# Patient Record
Sex: MALE | Race: WHITE | NOT HISPANIC OR LATINO | Employment: OTHER | URBAN - METROPOLITAN AREA
[De-identification: names, ages, dates, MRNs, and addresses within clinical notes are randomized per-mention and may not be internally consistent; named-entity substitution may affect disease eponyms.]

---

## 2017-04-04 ENCOUNTER — OFFICE VISIT (OUTPATIENT)
Dept: CARDIOLOGY | Facility: CLINIC | Age: 82
End: 2017-04-04
Payer: MEDICARE

## 2017-04-04 ENCOUNTER — CLINICAL SUPPORT (OUTPATIENT)
Dept: CARDIOLOGY | Facility: CLINIC | Age: 82
End: 2017-04-04
Payer: MEDICARE

## 2017-04-04 VITALS
DIASTOLIC BLOOD PRESSURE: 77 MMHG | HEIGHT: 67 IN | BODY MASS INDEX: 29.52 KG/M2 | HEART RATE: 51 BPM | WEIGHT: 188.06 LBS | SYSTOLIC BLOOD PRESSURE: 151 MMHG

## 2017-04-04 DIAGNOSIS — I65.23 BILATERAL CAROTID ARTERY STENOSIS: ICD-10-CM

## 2017-04-04 DIAGNOSIS — I65.23 BILATERAL CAROTID ARTERY STENOSIS: Primary | ICD-10-CM

## 2017-04-04 DIAGNOSIS — I10 ESSENTIAL HYPERTENSION: ICD-10-CM

## 2017-04-04 DIAGNOSIS — I25.10 CORONARY ARTERY DISEASE INVOLVING NATIVE CORONARY ARTERY OF NATIVE HEART WITHOUT ANGINA PECTORIS: ICD-10-CM

## 2017-04-04 LAB — INTERNAL CAROTID STENOSIS: NORMAL

## 2017-04-04 PROCEDURE — 93880 EXTRACRANIAL BILAT STUDY: CPT | Mod: 26,S$PBB,, | Performed by: INTERNAL MEDICINE

## 2017-04-04 PROCEDURE — 99999 PR PBB SHADOW E&M-EST. PATIENT-LVL III: CPT | Mod: PBBFAC,,, | Performed by: INTERNAL MEDICINE

## 2017-04-04 PROCEDURE — 99215 OFFICE O/P EST HI 40 MIN: CPT | Mod: S$PBB,,, | Performed by: INTERNAL MEDICINE

## 2017-04-04 PROCEDURE — 99213 OFFICE O/P EST LOW 20 MIN: CPT | Mod: PBBFAC | Performed by: INTERNAL MEDICINE

## 2017-04-04 RX ORDER — FENOFIBRATE 160 MG/1
160 TABLET ORAL DAILY
COMMUNITY

## 2017-04-04 RX ORDER — CLOPIDOGREL BISULFATE 75 MG/1
75 TABLET ORAL DAILY
Qty: 30 TABLET | Refills: 11 | Status: SHIPPED | OUTPATIENT
Start: 2017-04-04

## 2017-04-04 NOTE — PROGRESS NOTES
"Subjective:       Patient ID: Narinder Cueto Jr. is a 83 y.o. male.    Chief Complaint: Coronary Artery Disease    HPI     Mr. Cueto is an 83 year old gentleman with a past medical history of CVA, CAD, DM2, HTN, COPD, prior tobacco abuse, and carotid artery stenosis s/p bilateral stenting. He initially received a LICA stent via the SCAFFOLD trial on 03/2015, but had instent restenosis whereby he had POBA to the stent in 09/2015. He also had a HANNAH stent (not a part of trial) in June 2015. He has been doing well since then with no complaints. He has no SOB or CP. He has chronic dizziness that has not changed. He denies any weakness, numbness or other stroke symptoms. He still mows his lawn and gill his flowers every day. He measures his blood pressure at home twice a day and it averages in the 130s systolic    Review of Systems   Constitutional: Negative for appetite change, chills, fatigue and fever.   HENT: Negative.    Eyes: Negative.    Respiratory: Negative for cough and shortness of breath.    Cardiovascular: Negative for chest pain, palpitations and leg swelling.   Gastrointestinal: Negative for abdominal pain, nausea and vomiting.   Genitourinary: Negative for dysuria and urgency.   Musculoskeletal: Negative for arthralgias and myalgias.   Skin: Negative for color change and rash.   Neurological: Negative for dizziness, light-headedness and headaches.   Psychiatric/Behavioral: Negative.        Objective:     Vitals:    04/04/17 0925 04/04/17 0934   BP: (!) 166/77 (!) 151/77   Pulse: (!) 51    Weight: 85.3 kg (188 lb 0.8 oz)    Height: 5' 7" (1.702 m)        Physical Exam   Constitutional: He is oriented to person, place, and time. Vital signs are normal. He appears well-developed and well-nourished. He is active and cooperative. No distress.   HENT:   Head: Normocephalic and atraumatic.   Right Ear: Hearing and external ear normal.   Left Ear: Hearing and external ear normal.   Nose: Nose normal.   Neck: No " JVD present. Carotid bruit is not present.   Cardiovascular: Normal rate, regular rhythm, S1 normal, S2 normal and normal heart sounds.  Exam reveals no gallop.    No murmur heard.  Pulses:       Carotid pulses are 2+ on the right side, and 2+ on the left side.       Radial pulses are 2+ on the right side, and 2+ on the left side.        Dorsalis pedis pulses are 2+ on the right side, and 2+ on the left side.        Posterior tibial pulses are 2+ on the right side, and 2+ on the left side.   Pulmonary/Chest: Effort normal and breath sounds normal. No respiratory distress. He has no decreased breath sounds. He has no wheezes. He has no rhonchi. He has no rales.   Abdominal: Soft. Normal appearance.   Neurological: He is alert and oriented to person, place, and time.   Skin: Skin is warm, dry and intact.       Assessment:       1. Bilateral carotid artery stenosis    2. Essential hypertension    3. Coronary artery disease involving native coronary artery of native heart without angina pectoris        Plan:       Mr. Cueto is an 83 year old gentleman with multiple comorbidities who presents for a 6 month follow up for the SCAFFOLD trial.    1.) Bilateral carotid artery stenosis  --reviewed carotid ultrasound. No significant stenosis observed  --currently asymptomatic. Will follow up in 1 year with repeat scan    2.) HTN  --will not change any meds as well controlled at home  --continue to monitor    3.) CAD  --no anginal symptoms currently  --will refill plavix.    Discussed with Dr. Hameed. Staff addendum to follow    Lexi Rodriguez MD  Cardiology PGY4  I have personally taken the history and examined this patient. I have discussed and agree with the resident's findings and plan as documented in the resident's note.  Mando Hameed

## 2018-04-03 ENCOUNTER — OFFICE VISIT (OUTPATIENT)
Dept: CARDIOLOGY | Facility: CLINIC | Age: 83
End: 2018-04-03
Payer: MEDICARE

## 2018-04-03 ENCOUNTER — RESEARCH ENCOUNTER (OUTPATIENT)
Dept: RESEARCH | Facility: HOSPITAL | Age: 83
End: 2018-04-03

## 2018-04-03 ENCOUNTER — CLINICAL SUPPORT (OUTPATIENT)
Dept: CARDIOLOGY | Facility: CLINIC | Age: 83
End: 2018-04-03
Attending: INTERNAL MEDICINE
Payer: MEDICARE

## 2018-04-03 VITALS
SYSTOLIC BLOOD PRESSURE: 142 MMHG | HEART RATE: 56 BPM | DIASTOLIC BLOOD PRESSURE: 81 MMHG | BODY MASS INDEX: 27.89 KG/M2 | HEIGHT: 67 IN | OXYGEN SATURATION: 96 % | WEIGHT: 177.69 LBS

## 2018-04-03 DIAGNOSIS — E78.5 DYSLIPIDEMIA: ICD-10-CM

## 2018-04-03 DIAGNOSIS — I10 ESSENTIAL HYPERTENSION: ICD-10-CM

## 2018-04-03 DIAGNOSIS — I65.23 BILATERAL CAROTID ARTERY STENOSIS: ICD-10-CM

## 2018-04-03 DIAGNOSIS — I25.10 CORONARY ARTERY DISEASE INVOLVING NATIVE CORONARY ARTERY OF NATIVE HEART WITHOUT ANGINA PECTORIS: ICD-10-CM

## 2018-04-03 DIAGNOSIS — R42 DIZZINESS: Primary | ICD-10-CM

## 2018-04-03 LAB — INTERNAL CAROTID STENOSIS: ABNORMAL

## 2018-04-03 PROCEDURE — 99215 OFFICE O/P EST HI 40 MIN: CPT | Mod: S$PBB,,, | Performed by: INTERNAL MEDICINE

## 2018-04-03 PROCEDURE — 99215 OFFICE O/P EST HI 40 MIN: CPT | Mod: PBBFAC | Performed by: INTERNAL MEDICINE

## 2018-04-03 PROCEDURE — 99999 PR PBB SHADOW E&M-EST. PATIENT-LVL V: CPT | Mod: PBBFAC,,, | Performed by: INTERNAL MEDICINE

## 2018-04-03 PROCEDURE — 93880 EXTRACRANIAL BILAT STUDY: CPT | Mod: PBBFAC | Performed by: INTERNAL MEDICINE

## 2018-04-03 RX ORDER — ERGOCALCIFEROL 1.25 MG/1
1000 CAPSULE ORAL DAILY
COMMUNITY

## 2018-04-03 NOTE — PROGRESS NOTES
"Subjective:    Patient ID:  Narinder Cueto Jr. is a 84 y.o. male who presents for follow-up of Carotid Artery Disease      HPI    This is an 85 y/o M w/ a PMHx sig for CVA, CAD, DM2, HTN, COPD, prior tobacco abuse, and carotid artery stenosis s/p bilateral stenting. He initially received a LICA stent via the SCAFFOLD trial on 03/2015, but had instent restenosis whereby he had POBA to the stent in 09/2015. He presents here for follow-up.    Pt denies any TIA/CVA type sxs, amaurosis fugax, no sensory/motor deficits. He still has dizziness and headaches which has been chronic.  Pt still mows his law, washes his car, outdoor chores and is able to tolerate that except for some SOB. However no anginal  sxs.      ROS   Constitution: negative for - fever, chills, weight loss or weight gain  HENT: negative for - sore throat, rhinorrhea, or headache  Eyes: negative for - blurred or double vision  Cardiovascular: see above  Pulmonary: see above  Gastrointestinal: negative for - abdominal pain, nausea, vomiting, or diarrhea  : negative for - dysuria  Neurological: negative for - focal weakness or sensory changes       Objective:    Physical Exam   BP (!) 142/81 (BP Location: Right arm, Patient Position: Sitting, BP Method: Large (Automatic))   Pulse (!) 56   Ht 5' 6.5" (1.689 m)   Wt 80.6 kg (177 lb 11.1 oz)   SpO2 96%   BMI 28.25 kg/m²      Constitutional: No apparent distress, conversant  HEENT: Sclera anicteric, extraocular movements intact  Neck: No jugular venous distension, no carotid bruits  Cardiac: Regular rate and rhythm, no JVD,  no murmurs rubs or gallops, normal S1/S2, no LE edema  Pulm: Clear to auscultation bilaterally, no wheezes, rales, or ronchi  GI: Abdomen soft, nontender, nondistended  Skin: No ecchymosis, erythema, or ulcers  Psych: Alert and oriented to person place location, appropriate affect  Neuro: No focal deficits    Carotid Intervention 09/2015:     - Intracerebral Arteries:             The " left intracerebral arteries have luminal irregularities.       - Common Carotid Artery:             The CCA has luminal irregularities.       - Left Internal Carotid Artery:             The left ICA has a 90% stenosis within the stent.                     Lesion Details:  The length is 4 cm. The minimum luminal diameter is 0.9 millimeters. The reference vessel diameter is 5.5 millimeters.       - Common Femoral Artery:             The right CFA is normal.       - Femoral Artery:             The femoral artery is normal.    INTERVENTION:         Left ICA:              The following items were used: [id number] [place] Balloon.    C. SUMMARY/POST-OPERATIVE DIAGNOSIS:    1. Type II arch.  2. Successful PTA.  3. Successful POBA of Left ICA ISR in [facility] Trial patient using 5 mm angioguard filter.      Carotid US 4/3/18:  RIGHT  The right Distal Common Carotid Artery is visualized.   The right carotid bulb artery is visualized, associated with heterogeneous plaque.   The right Proximal Internal Carotid Artery has 40 - 49% stenosis.   There is acceleration in the right external carotid artery.   The right vertebral artery is visualized, associated with anterograde flow.   The right ICA/CCA ratio is: 2.72    LEFT  The left Distal Common Carotid Artery is visualized.   The left carotid bulb artery is visualized.   The left Proximal Internal Carotid Artery has 0 - 19% stenosis.   There is acceleration in the left external carotid artery.   The left vertebral artery is visualized, associated with anterograde flow.   The left ICA/CCA ratio is: 1.25  There is a stent in the left Distal Common Carotid Artery, carotid bulb artery, and Proximal Internal Carotid Artery.       CONCLUSIONS   There is 40 - 49% right Internal Carotid stenosis.  There is 0 - 19% left Internal Carotid stenosis.      Assessment:       1. Dizziness    2. Bilateral carotid artery stenosis    3. Coronary artery disease involving native coronary artery  of native heart without angina pectoris    4. Dyslipidemia    5. Essential hypertension         Plan:       Carotid artery stenosis  - no interval sxs  - Carotid US today shows patent stents  - Will see again in 1 year w/ repeat carotid US      CAD (coronary artery disease)  No angina  Cont ASA, statin, BB and ACEI    Dyslipidemia  On statin    Hypertension  Bps stable  Cont anti-HTN meds    Dizziness  Unsure of etiology. Pts HR in 50s today. Could have possible age related conduction system disease  Holter 24 hr to r/o SB or high grade AV block      Above plan d/w Dr. Hameed who is in agreement.    Please page/call if any questions or concerns.     Reyna Hutchinson MD  Interventional Cardiology Fellow  Pager: [phone]    I have personally taken the history and examined this patient. I have discussed and agree with the resident's findings and plan as documented in the resident's note.  Mando Hameed

## 2018-04-03 NOTE — ASSESSMENT & PLAN NOTE
- no interval sxs  - Carotid US today shows patent stents  - Will see again in 1 year w/ repeat carotid US

## 2018-04-03 NOTE — ASSESSMENT & PLAN NOTE
Unsure of etiology. Pts HR in 50s today. Could have possible age related conduction system disease  Holter 24 hr to r/o SB or high grade AV block

## 2018-04-04 NOTE — PROGRESS NOTES
I met with a kind [facility] patient for his 3 year and last follow up visit for this study. At that time a stroke scale was conducted per protocol.